# Patient Record
Sex: MALE | Race: AMERICAN INDIAN OR ALASKA NATIVE
[De-identification: names, ages, dates, MRNs, and addresses within clinical notes are randomized per-mention and may not be internally consistent; named-entity substitution may affect disease eponyms.]

---

## 2017-06-03 ENCOUNTER — HOSPITAL ENCOUNTER (EMERGENCY)
Dept: HOSPITAL 31 - C.ER | Age: 32
Discharge: HOME | End: 2017-06-03
Payer: MEDICAID

## 2017-06-03 VITALS
HEART RATE: 87 BPM | SYSTOLIC BLOOD PRESSURE: 129 MMHG | TEMPERATURE: 98.1 F | RESPIRATION RATE: 20 BRPM | DIASTOLIC BLOOD PRESSURE: 79 MMHG | OXYGEN SATURATION: 98 %

## 2017-06-03 DIAGNOSIS — S83.91XA: Primary | ICD-10-CM
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
## 2017-06-04 NOTE — RAD
PROCEDURE:  Right Knee Radiographs.



HISTORY:

pain, twisting injury



COMPARISON:

None.



FINDINGS:



BONES:

No evidence of acute displaced fracture nor dislocation. 



JOINTS:

Normal. No osteoarthritis. 



JOINT EFFUSION:

Moderately large suprapatellar joint effusion. 



OTHER FINDINGS:

Small calcification within the popliteal region could represent 

calcification within a popliteal cyst. .  There is also small rounded 

radiopaque density overlying the midshaft of the femur of uncertain 

etiology seen on the lateral projection. .  Clinic correlation with 

physical exam recommended. 



IMPRESSION:

Moderately large joint effusion.  Internal derangement could be 

excluded with followup MRI. Report placed in PA review folder 

followup

## 2018-03-27 ENCOUNTER — HOSPITAL ENCOUNTER (EMERGENCY)
Dept: HOSPITAL 31 - C.ER | Age: 33
LOS: 1 days | Discharge: HOME | End: 2018-03-28
Payer: COMMERCIAL

## 2018-03-27 VITALS — RESPIRATION RATE: 22 BRPM

## 2018-03-27 DIAGNOSIS — R10.9: Primary | ICD-10-CM

## 2018-03-27 LAB
ALBUMIN SERPL-MCNC: 4.2 G/DL (ref 3.5–5)
ALBUMIN/GLOB SERPL: 1.3 {RATIO} (ref 1–2.1)
ALT SERPL-CCNC: 30 U/L (ref 21–72)
APTT BLD: 27 SECONDS (ref 21–34)
AST SERPL-CCNC: 19 U/L (ref 17–59)
BASOPHILS # BLD AUTO: 0 K/UL (ref 0–0.2)
BASOPHILS NFR BLD: 0.7 % (ref 0–2)
BILIRUB UR-MCNC: NEGATIVE MG/DL
BUN SERPL-MCNC: 16 MG/DL (ref 9–20)
CALCIUM SERPL-MCNC: 9 MG/DL (ref 8.6–10.4)
EOSINOPHIL # BLD AUTO: 0.1 K/UL (ref 0–0.7)
EOSINOPHIL NFR BLD: 1.3 % (ref 0–4)
ERYTHROCYTE [DISTWIDTH] IN BLOOD BY AUTOMATED COUNT: 15.2 % (ref 11.5–14.5)
GFR NON-AFRICAN AMERICAN: > 60
GLUCOSE UR STRIP-MCNC: NORMAL MG/DL
HGB BLD-MCNC: 13.9 G/DL (ref 12–18)
INR PPP: 1.2
LEUKOCYTE ESTERASE UR-ACNC: (no result) LEU/UL
LIPASE: 63 U/L (ref 23–300)
LYMPHOCYTES # BLD AUTO: 1 K/UL (ref 1–4.3)
LYMPHOCYTES NFR BLD AUTO: 13.7 % (ref 20–40)
MCH RBC QN AUTO: 29 PG (ref 27–31)
MCHC RBC AUTO-ENTMCNC: 33.7 G/DL (ref 33–37)
MCV RBC AUTO: 86 FL (ref 80–94)
MONOCYTES # BLD: 0.5 K/UL (ref 0–0.8)
MONOCYTES NFR BLD: 7.7 % (ref 0–10)
NEUTROPHILS # BLD: 5.3 K/UL (ref 1.8–7)
NEUTROPHILS NFR BLD AUTO: 76.6 % (ref 50–75)
NRBC BLD AUTO-RTO: 0 % (ref 0–2)
PH UR STRIP: 5 [PH] (ref 5–8)
PLATELET # BLD: 244 K/UL (ref 130–400)
PMV BLD AUTO: 8.6 FL (ref 7.2–11.7)
PROT UR STRIP-MCNC: (no result) MG/DL
PROTHROMBIN TIME: 13.9 SECONDS (ref 9.7–12.2)
RBC # BLD AUTO: 4.81 MIL/UL (ref 4.4–5.9)
RBC # UR STRIP: (no result) /UL
SP GR UR STRIP: 1.04 (ref 1–1.03)
UROBILINOGEN UR-MCNC: 2 MG/DL (ref 0.2–1)
WBC # BLD AUTO: 7 K/UL (ref 4.8–10.8)

## 2018-03-27 PROCEDURE — 85025 COMPLETE CBC W/AUTO DIFF WBC: CPT

## 2018-03-27 PROCEDURE — 81001 URINALYSIS AUTO W/SCOPE: CPT

## 2018-03-27 PROCEDURE — 85730 THROMBOPLASTIN TIME PARTIAL: CPT

## 2018-03-27 PROCEDURE — 83690 ASSAY OF LIPASE: CPT

## 2018-03-27 PROCEDURE — 96361 HYDRATE IV INFUSION ADD-ON: CPT

## 2018-03-27 PROCEDURE — 96374 THER/PROPH/DIAG INJ IV PUSH: CPT

## 2018-03-27 PROCEDURE — 99284 EMERGENCY DEPT VISIT MOD MDM: CPT

## 2018-03-27 PROCEDURE — 96375 TX/PRO/DX INJ NEW DRUG ADDON: CPT

## 2018-03-27 PROCEDURE — 85610 PROTHROMBIN TIME: CPT

## 2018-03-27 PROCEDURE — 80053 COMPREHEN METABOLIC PANEL: CPT

## 2018-03-27 NOTE — C.PDOC
History Of Present Illness





32 yo male, presnets with abd pain n/v/d since yesterday. decreased po intake 

at home. nofever no blood in stool. no sick contacts. 


Time Seen by Provider: 03/27/18 21:51


Chief Complaint (Nursing): Abdominal Pain





Past Medical History


Reviewed: Historical Data, Nursing Documentation, Vital Signs


Vital Signs: 


 Last Vital Signs











Temp  97.9 F   03/28/18 00:22


 


Pulse  61   03/28/18 00:22


 


Resp  22   03/28/18 00:22


 


BP  112/66   03/28/18 00:22


 


Pulse Ox  98   03/28/18 00:45











Family History: States: Unknown Family Hx





- Social History


Hx Alcohol Use: No


Hx Substance Use: No





- Immunization History


Hx Tetanus Toxoid Vaccination: No


Hx Influenza Vaccination: Yes


Hx Pneumococcal Vaccination: No





Review Of Systems


Gastrointestinal: Positive for: Nausea, Vomiting, Abdominal Pain, Diarrhea





Physical Exam





- Physical Exam


Appears: Well, No Acute Distress


Skin: Normal Color, Warm, Dry


Eye(s): bilateral: Normal Inspection, PERRL, EOMI


Nose: Normal


Throat: Normal


Neck: Normal


Cardiovascular: Rhythm Regular


Respiratory: Normal Breath Sounds


Gastrointestinal/Abdominal: Normal Exam, Soft, Tenderness (minimal non focal), 

No Guarding, No Rebound


Back: Normal Inspection


Extremity: Normal ROM





ED Course And Treatment





- Laboratory Results


Result Diagrams: 


 03/27/18 22:31





 03/27/18 22:31


O2 Sat by Pulse Oximetry: 98





Medical Decision Making


Medical Decision Making: 





r.o gastritis food poisoning colitis- labs pending





pt reassesed taking po bedside abd soft no ttp. notified of microscopic 

hematuria. pt asking for dc. advise ooutpt fu and return precautions





Disposition





- Disposition


Referrals: 


Non [facility] Provider, [Primary Care Provider] - 


[facility] [Outside]


 Service [Outside]


Rupesh Alarcon MD [Staff Provider] - 


Adarsh Leal MD [Staff Provider] - 


Disposition: HOME/ ROUTINE


Disposition Time: 23:57


Condition: STABLE


Additional Instructions: 


please discuss the results of your labs tests and urine with your doctor/clinic 

and specialists return to er with worsening symptoms or concerns. 


Instructions:  Acute Abdomen (Belly Pain)


Forms:  [facility] (English), Work Excuse





- Clinical Impression


Clinical Impression: 


 Abdominal pain

## 2018-03-28 VITALS — HEART RATE: 61 BPM | TEMPERATURE: 97.9 F | SYSTOLIC BLOOD PRESSURE: 112 MMHG | DIASTOLIC BLOOD PRESSURE: 66 MMHG

## 2018-03-28 VITALS — OXYGEN SATURATION: 98 %

## 2025-01-21 NOTE — C.PDOC
History Of Present Illness


Patient is a 33 y/o male that presents to the emergency department for 

evaluation of right knee pain. Patient states he was playing football, when his 

right knee buckled in, and his right leg extended backwards. Pt presents with 

increased pain and swelling to right knee. Otherwise, denies any direct trauma, 

head injury, LOC, extremity weakness/numbness, or any other associated symptoms 

at this time. 


Time Seen by Provider: 06/03/17 19:21


Chief Complaint (Nursing): Lower Extremity Problem/Injury


History Per: Patient


History/Exam Limitations: no limitations


Onset/Duration Of Symptoms: Hrs


Current Symptoms Are (Timing): Still Present


Recent travel outside of the [place]: No


Additional History Per: Patient





Past Medical History


Reviewed: Historical Data, Nursing Documentation, Vital Signs


Vital Signs: 


 Last Vital Signs











Temp  98.1 F   06/03/17 19:06


 


Pulse  87   06/03/17 19:06


 


Resp  20   06/03/17 20:36


 


BP  129/79   06/03/17 19:06


 


Pulse Ox  98   06/03/17 20:27











Family History: States: No Known Family Hx





- Social History


Hx Alcohol Use: No


Hx Substance Use: No





- Immunization History


Hx Tetanus Toxoid Vaccination: No


Hx Influenza Vaccination: No


Hx Pneumococcal Vaccination: No





Review Of Systems


Except As Marked, All Systems Reviewed And Found Negative.


Constitutional: Negative for: Fever


Musculoskeletal: Positive for: Leg Pain (right knee pain and swelling)


Neurological: Negative for: Weakness, Numbness





Physical Exam





- Physical Exam


Appears: Non-toxic, No Acute Distress


Skin: Normal Color, Warm, Dry, No Ecchymosis


Head: Atraumatic, Normacephalic


Eye(s): bilateral: Normal Inspection, EOMI


Extremity: No Normal ROM (limited ROM to right knee secondary to pain), 

Tenderness (anterior aspect of right knee, greater in the infrapatellar area of 

right knee), Capillary Refill (< 2 sec.), No Deformity, Swelling (minimal 

swelling to anterior aspect of right knee)


Extremity: Bilateral: Normal Color And Temperature


Pulses: Left Dorsalis Pedis: Normal, Right Dorsalis Pedis: Normal


Neurological/Psych: Oriented x3, Normal Speech, Normal Cognition, Normal Motor, 

Normal Sensation (normal strength and sensation)





ED Course And Treatment


O2 Sat by Pulse Oximetry: 98 (on RA)


Pulse Ox Interpretation: Normal


Progress Note: Right knee x-ray ordered and reviewed. Pt was given Motrin and 

Percocet in the ER. On re-exam, patient reports improvement of pain, reports 

feeling better. Knee brace applied to right knee by CP and checked by me  and 

given crutches with instructions. Patient is being discharged home, with 

instructions to follow up with orthopedist.


Reassessment Condition: Improved





Disposition





- Disposition


Referrals: 


Octavio Lynch III, MD [Staff Provider] - 


Disposition: HOME/ ROUTINE


Disposition Time: 20:04


Condition: STABLE


Additional Instructions: 


Please follow up with orthopedist





Take motrin PO 





Elevate his leg





Return to ER if worse 


Prescriptions: 


Ibuprofen [Motrin Tab] 800 mg PO QID #30 tab


Instructions:  Knee Sprain (ED)


Forms:  Work Excuse





- Clinical Impression


Clinical Impression: 


 Right knee sprain








- PA / NP / Resident Statement


MD/DO has reviewed & agrees with the documentation as recorded.





- Scribe Statement


The provider has reviewed the documentation as recorded by the Lauraibe





Nahed Erickson





All medical record entries made by the Scribe were at my direction and 

personally dictated by me. I have reviewed the chart and agree that the record 

accurately reflects my personal performance of the history, physical exam, 

medical decision making, and the department course for this patient. I have 

also personally directed, reviewed, and agree with the discharge instructions 

and disposition.
Time Seen by Provider: 06/03/17 19:21


Chief Complaint (Nursing): Lower Extremity Problem/Injury





Past Medical History


Vital Signs: 





 Last Vital Signs











Temp  98.1 F   06/03/17 19:06


 


Pulse  87   06/03/17 19:06


 


Resp  20   06/03/17 19:06


 


BP  129/79   06/03/17 19:06


 


Pulse Ox  98   06/03/17 19:06














- Social History


Hx Alcohol Use: No


Hx Substance Use: No





- Immunization History


Hx Tetanus Toxoid Vaccination: No


Hx Influenza Vaccination: No


Hx Pneumococcal Vaccination: No





ED Course And Treatment


O2 Sat by Pulse Oximetry: 98





Disposition


Counseled Patient/Family Regarding: Diagnosis, Need For Followup, Rx Given





- Disposition


Referrals: 


Octavio Lynch III, MD [Staff Provider] - 


Disposition: HOME/ ROUTINE


Disposition Time: 20:04


Condition: STABLE


Additional Instructions: 


Please follow up with orthopedist





Take motrin PO 





Elevate his leg





Return to ER if worse 


Prescriptions: 


Ibuprofen [Motrin Tab] 800 mg PO QID #30 tab


Instructions:  Knee Sprain (ED)





- Clinical Impression


Clinical Impression: 


 Right knee sprain
Car